# Patient Record
Sex: MALE | Race: WHITE | Employment: FULL TIME | URBAN - METROPOLITAN AREA
[De-identification: names, ages, dates, MRNs, and addresses within clinical notes are randomized per-mention and may not be internally consistent; named-entity substitution may affect disease eponyms.]

---

## 2022-04-25 ENCOUNTER — HOSPITAL ENCOUNTER (OUTPATIENT)
Dept: PREADMISSION TESTING | Age: 57
Discharge: HOME | End: 2022-04-29
Payer: COMMERCIAL

## 2022-04-25 VITALS
BODY MASS INDEX: 25.33 KG/M2 | HEART RATE: 56 BPM | SYSTOLIC BLOOD PRESSURE: 143 MMHG | WEIGHT: 187 LBS | RESPIRATION RATE: 20 BRPM | OXYGEN SATURATION: 99 % | DIASTOLIC BLOOD PRESSURE: 76 MMHG | TEMPERATURE: 97.3 F | HEIGHT: 72 IN

## 2022-04-25 LAB
ABSOLUTE EOS #: 0.14 K/UL (ref 0–0.44)
ABSOLUTE IMMATURE GRANULOCYTE: 0.02 K/UL (ref 0–0.3)
ABSOLUTE LYMPH #: 1.19 K/UL (ref 1.1–3.7)
ABSOLUTE MONO #: 0.48 K/UL (ref 0.1–1.2)
ANION GAP SERPL CALCULATED.3IONS-SCNC: 7 MMOL/L (ref 9–17)
BASOPHILS # BLD: 1 % (ref 0–2)
BASOPHILS ABSOLUTE: 0.04 K/UL (ref 0–0.2)
BILIRUBIN URINE: NEGATIVE
BUN BLDV-MCNC: 19 MG/DL (ref 6–20)
BUN/CREAT BLD: 17 (ref 9–20)
CALCIUM SERPL-MCNC: 9.8 MG/DL (ref 8.6–10.4)
CHLORIDE BLD-SCNC: 102 MMOL/L (ref 98–107)
CO2: 29 MMOL/L (ref 20–31)
COLOR: YELLOW
COMMENT UA: NORMAL
CREAT SERPL-MCNC: 1.09 MG/DL (ref 0.7–1.2)
EOSINOPHILS RELATIVE PERCENT: 3 % (ref 1–4)
GFR AFRICAN AMERICAN: >60 ML/MIN
GFR NON-AFRICAN AMERICAN: >60 ML/MIN
GFR SERPL CREATININE-BSD FRML MDRD: ABNORMAL ML/MIN/{1.73_M2}
GLUCOSE BLD-MCNC: 96 MG/DL (ref 70–99)
GLUCOSE URINE: NEGATIVE
HCT VFR BLD CALC: 44.3 % (ref 40.7–50.3)
HEMOGLOBIN: 14.6 G/DL (ref 13–17)
IMMATURE GRANULOCYTES: 1 %
INR BLD: 1
KETONES, URINE: NEGATIVE
LEUKOCYTE ESTERASE, URINE: NEGATIVE
LYMPHOCYTES # BLD: 28 % (ref 24–43)
MCH RBC QN AUTO: 29.2 PG (ref 25.2–33.5)
MCHC RBC AUTO-ENTMCNC: 33 G/DL (ref 28.4–34.8)
MCV RBC AUTO: 88.6 FL (ref 82.6–102.9)
MONOCYTES # BLD: 11 % (ref 3–12)
MRSA, DNA, NASAL: NEGATIVE
NITRITE, URINE: NEGATIVE
NRBC AUTOMATED: 0 PER 100 WBC
PARTIAL THROMBOPLASTIN TIME: 32 SEC (ref 23.9–33.8)
PDW BLD-RTO: 12.2 % (ref 11.8–14.4)
PH UA: 7 (ref 5–8)
PLATELET # BLD: 225 K/UL (ref 138–453)
PMV BLD AUTO: 9.7 FL (ref 8.1–13.5)
POTASSIUM SERPL-SCNC: 4.7 MMOL/L (ref 3.7–5.3)
PROTEIN UA: NEGATIVE
PROTHROMBIN TIME: 13.2 SEC (ref 11.5–14.2)
RBC # BLD: 5 M/UL (ref 4.21–5.77)
SEG NEUTROPHILS: 56 % (ref 36–65)
SEGMENTED NEUTROPHILS ABSOLUTE COUNT: 2.38 K/UL (ref 1.5–8.1)
SODIUM BLD-SCNC: 138 MMOL/L (ref 135–144)
SPECIFIC GRAVITY UA: 1.01 (ref 1–1.03)
SPECIMEN DESCRIPTION: NORMAL
TURBIDITY: CLEAR
URINE HGB: NEGATIVE
UROBILINOGEN, URINE: NORMAL
WBC # BLD: 4.3 K/UL (ref 3.5–11.3)

## 2022-04-25 PROCEDURE — 85610 PROTHROMBIN TIME: CPT

## 2022-04-25 PROCEDURE — 93005 ELECTROCARDIOGRAM TRACING: CPT | Performed by: ORTHOPAEDIC SURGERY

## 2022-04-25 PROCEDURE — 80048 BASIC METABOLIC PNL TOTAL CA: CPT

## 2022-04-25 PROCEDURE — 81003 URINALYSIS AUTO W/O SCOPE: CPT

## 2022-04-25 PROCEDURE — 85730 THROMBOPLASTIN TIME PARTIAL: CPT

## 2022-04-25 PROCEDURE — 36415 COLL VENOUS BLD VENIPUNCTURE: CPT

## 2022-04-25 PROCEDURE — 87086 URINE CULTURE/COLONY COUNT: CPT

## 2022-04-25 PROCEDURE — 85025 COMPLETE CBC W/AUTO DIFF WBC: CPT

## 2022-04-25 PROCEDURE — 87641 MR-STAPH DNA AMP PROBE: CPT

## 2022-04-25 RX ORDER — ROSUVASTATIN CALCIUM 10 MG/1
10 TABLET, COATED ORAL NIGHTLY
COMMUNITY

## 2022-04-25 RX ORDER — CALCIUM POLYCARBOPHIL 625 MG 625 MG/1
625 TABLET ORAL DAILY
COMMUNITY

## 2022-04-25 RX ORDER — M-VIT,TX,IRON,MINS/CALC/FOLIC 27MG-0.4MG
1 TABLET ORAL DAILY
COMMUNITY

## 2022-04-25 ASSESSMENT — PAIN DESCRIPTION - LOCATION: LOCATION: NECK

## 2022-04-25 ASSESSMENT — PAIN SCALES - GENERAL: PAINLEVEL_OUTOF10: 4

## 2022-04-25 ASSESSMENT — PAIN DESCRIPTION - DESCRIPTORS: DESCRIPTORS: ACHING;DISCOMFORT

## 2022-04-25 ASSESSMENT — PAIN DESCRIPTION - PAIN TYPE: TYPE: CHRONIC PAIN

## 2022-04-25 ASSESSMENT — PAIN DESCRIPTION - ORIENTATION: ORIENTATION: RIGHT;LEFT;MID

## 2022-04-25 NOTE — PRE-PROCEDURE INSTRUCTIONS
[facility]  Monday May 16th   Arrive at 5:30  Any questions call [phone]    Once you enter the hospital lobby, take the elevators to the second floor. Check-In is at the surgery registration desk. Continue to take your home medications as you normally do up to and including the night before surgery with the exception of any blood thinning medications. Please stop any blood thinning medications as directed by your surgeon or prescribing physician. Failure to stop certain medications may interfere with your scheduled surgery. These may include:  Aspirin, Warfarin (Coumadin), Clopidogrel (Plavix), Ibuprofen (Motrin, Advil), Naproxen (Aleve), Meloxicam (Mobic), Celecoxib (Celebrex), Eliquis, Pradaxa, Xarelto, Effient, Fish Oil, Herbal supplements. Please take the following medication(s) the day of surgery with a small sip of water:  none    PREPARING FOR YOUR SURGERY:     Before surgery, you can play an important role in your own health. Because skin is not sterile, we need to be sure that your skin is as free of germs as possible before surgery by carefully washing before surgery. Preparing or prepping skin before surgery can reduce the risk of a surgical site infection.   Do not shave the area of your body where your surgery will be performed unless you received specific permission from your physician. You will need to shower at home the night before surgery and the morning of surgery with a special soap called chlorhexidine gluconate (CHG*). *Not to be used by people allergic to Chlorhexidine Gluconate (CHG). Following these instructions will help you be sure that your skin is clean before surgery. Instructions on cleaning your skin before surgery: The night before your surgery:      You will need to shower with warm water (not hot) and the CHG soap.  Use a clean wash cloth and a clean towel. Have clean clothes available to put on after the shower.   First wash your hair with regular shampoo. Rinse your hair and body thoroughly to remove the shampoo. Zannie Cowden Wash your face with your regular soap or water only. Thoroughly rinse your body with warm water from the neck down.  Turn water off to prevent rinsing the soap off too soon.  With a clean wet washcloth and half of the CHG soap in the bottle, lather your entire body from the neck down. Do not use CHG soap near your eyes or ears to avoid injury to those areas.  Wash thoroughly, paying special attention to the area where your surgery will be performed.  Wash your body gently for five (5) minutes. Avoid scrubbing your skin too hard.  Turn the water back on and rinse your body thoroughly.  Pat yourself dry with a clean, soft towel. Do not apply lotion, cream or powder.  Dress with clean freshly washed clothes. The morning of surgery:     Repeat shower following steps above - using remaining half of CHG soap in bottle. Patient Instructions:    Zannie Cowden If you are having any type of anesthesia you are to have nothing to eat or drink after midnight the night before your surgery. This includes gum, mints, water or smoking or chewing tobacco.  The only exception to this is a small sip of water to take with any morning dose of heart, blood pressure, or seizure medications. No alcoholic beverages for 24 hours prior to surgery.  Brush your teeth but do not swallow water. · Do not wear any jewelry or body piercings day of surgery. Also, NO lotion, perfume or deodorant to be used the day of surgery. No nail polish on the operative extremity (arm/leg surgeries)    · Do not bring any valuables such as jewelry, cash, or credit cards. If you are staying overnight with us, please bring a small bag of personal items.  Please wear loose, comfortable clothing. If you are potentially going to have a cast or brace bring clothing that will fit over them.

## 2022-04-25 NOTE — H&P
History and Physical Service   [place] [address]    HISTORY AND PHYSICAL EXAMINATION            Date of Evaluation: 4/25/2022  Patient name:  Carlota Kearney  MRN:   [id number]  YOB: 1965  PCP:    Cassandra Woods MD    History Obtained From:     Patient, medical records    History of Present Illness: This is Carlota Kearney a 55 y.o. male who presents for a pre-admission testing appointment for an upcoming C 6-7 ACDF WITH HARDWARE REMOVAL @ C5-6 - [employer] [facility] by Tj Calvillo MD scheduled on 5/16/2022  at 0730  due to [address] [facility] [address]. The patient's chief complaint is neck shoulder and bilateral arm pain, numbness and tingling. 8-9 /10 pain that has progressively worsened over the past several years,. THE neck and shoulder pain  is CONTINUOUS WITH RIGHT ARM WEAKNESS INTERMITTENTLY, MINIMALLY CONTROLLED WITH ADVIL. Prior treatment includes STEROID INJECTIONS WHICH WERE HELPFUL AND PHYSICAL THERAPY WHICH WAS TEMPORARILY HELPFUL. Shirley Wang Denies recent falls and injuries. HAD A WHIPLASH INJURY 10 YEARS AGO. HE WORKS AS A . HE NOW PRESENTS FOR SURGICAL CORRECTION. Functional Capacity per pt:   1) Pt is able to walk 2 city blocks on level ground without SOB. 2) Pt is  able to climb 2 flights of stairs without SOB. 3) Pt is able to walk up a hill for 1-2 city blocks without SOB.     Past Medical History:     Past Medical History:   Diagnosis Date    Cancer ([address]     basil carcinoma    PONV (postoperative nausea and vomiting)         Past Surgical History:     Past Surgical History:   Procedure Laterality Date    ACHILLES TENDON SURGERY Right     APPENDECTOMY      COLONOSCOPY      KNEE CARTILAGE SURGERY Bilateral     NECK SURGERY  2020    C5 -C6    ROTATOR CUFF REPAIR Right 12/2020    SKIN BIOPSY      ULNAR TUNNEL RELEASE Right 08/2020        Medications Prior to Admission:     Prior to Admission medications    Medication Sig Start Date End Date Taking? Authorizing Provider   rosuvastatin (CRESTOR) 10 MG tablet Take 10 mg by mouth at bedtime   Yes Historical Provider, MD   Multiple Vitamins-Minerals (THERAPEUTIC MULTIVITAMIN-MINERALS) tablet Take 1 tablet by mouth daily   Yes Historical Provider, MD   polycarbophil (FIBERCON) 625 MG tablet Take 625 mg by mouth daily   Yes Historical Provider, MD        Allergies:     Patient has no known allergies. Social History:     Tobacco:    reports that he has never smoked. He has never used smokeless tobacco.  Alcohol:      reports current alcohol use. Drug Use:  reports no history of drug use. Family History:     FATHER  OF HEART DISEASE, MOTHER  OF SUICIDE. 2 SIBLINGS ARE HEALTHY     Review of Systems:     Positive and Negative as described in HPI. CONSTITUTIONAL:  Negative for fevers, chills, sweats, fatigue, and weight loss. HEENT:  Negative for glasses, hearing changes, rhinorrhea, and throat pain. RESPIRATORY:  Negative for shortness of breath, cough, congestion, and wheezing. CARDIOVASCULAR:  Negative for chest pain, blood clot, irregular heartbeat, and palpitations. GASTROINTESTINAL:  Negative for reflux, nausea, vomiting, diarrhea, constipation, change in bowel habits, and abdominal pain. GENITOURINARY:  Negative for difficulty of urination, burning with urination, and frequency. INTEGUMENT:  Negative for rash, skin lesions, and easy bruising. HEMATOLOGIC/LYMPHATIC:  Negative for swelling/edema. ALLERGIC/IMMUNOLOGIC:  Negative for urticaria and itching. ENDOCRINE: NEGATIVE for increase in thirst, increase in urination, and heat or cold intolerance. MUSCULOSKELETAL: NECK AND JOINT pains, muscle aches, and swelling of joints. NEUROLOGICAL:  Negative for headaches, dizziness, lightheadedness, numbness, and tingling extremities. BEHAVIOR/PSYCH:  Negative for depression and anxiety.     Physical Exam:   BP (!) 143/76   Pulse 56   Temp 97.3 °F (36.3 °C) (Temporal)   Resp 20 Ht 6' (1.829 m)   Wt 187 lb (84.8 kg)   SpO2 99%   BMI 25.36 kg/m²   No results for input(s): POCGLU in the last 72 hours. General Appearance:  Alert, well appearing, and in no acute distress. Mental status:  Oriented to person, place, and time. Head:  Normocephalic and atraumatic. Eye:  No icterus, redness, pupils equal and reactive, extraocular eye movements intact, and conjunctiva clear. Ear:  Hearing grossly intact. Nose:  No drainage noted. Mouth:  Mucous membranes moist.  Neck:  Supple and no carotid bruits noted. Lungs:  Bilateral equal air entry, clear to auscultation, no wheezing, rales or rhonchi, and normal effort. Cardiovascular:  Normal rate, regular rhythm, no murmur, gallop, or rub. Abdomen:  Soft, nontender, nondistended, and active bowel sounds. Neurologic:  Normal speech and cranial nerves II through XII grossly intact. Strength 5/5 bilaterally. Skin:  No gross lesions, rashes, bruising, or bleeding on exposed skin area. Extremities:  Posterior tibial pulses 2+ bilaterally. No pedal edema. No calf tenderness with palpation. Psych:  Normal affect.      Investigations:      Laboratory Testing:  Recent Results (from the past 24 hour(s))   APTT    Collection Time: 04/25/22  8:23 AM   Result Value Ref Range    PTT 32.0 23.9 - 33.8 sec   CBC with Auto Differential    Collection Time: 04/25/22  8:23 AM   Result Value Ref Range    WBC 4.3 3.5 - 11.3 k/uL    RBC 5.00 4.21 - 5.77 m/uL    Hemoglobin 14.6 13.0 - 17.0 g/dL    Hematocrit 44.3 40.7 - 50.3 %    MCV 88.6 82.6 - 102.9 fL    MCH 29.2 25.2 - 33.5 pg    MCHC 33.0 28.4 - 34.8 g/dL    RDW 12.2 11.8 - 14.4 %    Platelets 377 580 - 345 k/uL    MPV 9.7 8.1 - 13.5 fL    NRBC Automated 0.0 0.0 per 100 WBC    Seg Neutrophils 56 36 - 65 %    Lymphocytes 28 24 - 43 %    Monocytes 11 3 - 12 %    Eosinophils % 3 1 - 4 %    Basophils 1 0 - 2 %    Immature Granulocytes 1 (H) 0 %    Segs Absolute 2.38 1.50 - 8.10 k/uL    Absolute Lymph # 1.19 1.10 - 3.70 k/uL    Absolute Mono # 0.48 0.10 - 1.20 k/uL    Absolute Eos # 0.14 0.00 - 0.44 k/uL    Basophils Absolute 0.04 0.00 - 0.20 k/uL    Absolute Immature Granulocyte 0.02 0.00 - 0.30 k/uL   Protime-INR    Collection Time: 04/25/22  8:23 AM   Result Value Ref Range    Protime 13.2 11.5 - 14.2 sec    INR 1.0        Recent Labs     04/25/22  0823   HGB 14.6   HCT 44.3   WBC 4.3   MCV 88.6   INR 1.0   PROTIME 13.2   APTT 32.0       No results for input(s): COVID19 in the last 720 hours. *Please note that labs listed above are the most recent lab values available in EPIC at the time of the visit and additional labs may have been drawn or resulted since that time. Imaging/Diagnostics:      No results found. EKG: EKG OF 4/25/2022 ON THE SHORT CHART See Epic. Diagnosis:      1. DX CERVICAL STENOSIS    Plans:     1.  C 6-7 ACDF WITH HARDWARE REMOVAL @ -[facility] [address] [place] [address], APRN - CNP  4/25/2022  8:42 AM

## 2022-04-26 LAB
CULTURE: NO GROWTH
EKG ATRIAL RATE: 48 BPM
EKG P AXIS: 27 DEGREES
EKG P-R INTERVAL: 184 MS
EKG Q-T INTERVAL: 420 MS
EKG QRS DURATION: 114 MS
EKG QTC CALCULATION (BAZETT): 375 MS
EKG R AXIS: -3 DEGREES
EKG T AXIS: 46 DEGREES
EKG VENTRICULAR RATE: 48 BPM
SPECIMEN DESCRIPTION: NORMAL

## 2022-05-16 ENCOUNTER — HOSPITAL ENCOUNTER (OUTPATIENT)
Age: 57
Discharge: HOME | End: 2022-05-17
Attending: ORTHOPAEDIC SURGERY | Admitting: ORTHOPAEDIC SURGERY
Payer: COMMERCIAL

## 2022-05-16 ENCOUNTER — ANESTHESIA (OUTPATIENT)
Dept: OPERATING ROOM | Age: 57
End: 2022-05-16
Payer: COMMERCIAL

## 2022-05-16 ENCOUNTER — ANESTHESIA EVENT (OUTPATIENT)
Dept: OPERATING ROOM | Age: 57
End: 2022-05-16
Payer: COMMERCIAL

## 2022-05-16 ENCOUNTER — APPOINTMENT (OUTPATIENT)
Dept: GENERAL RADIOLOGY | Age: 57
End: 2022-05-16
Attending: ORTHOPAEDIC SURGERY
Payer: COMMERCIAL

## 2022-05-16 DIAGNOSIS — M47.22 CERVICAL SPONDYLOSIS WITH RADICULOPATHY: Primary | Chronic | ICD-10-CM

## 2022-05-16 PROCEDURE — 6370000000 HC RX 637 (ALT 250 FOR IP): Performed by: ORTHOPAEDIC SURGERY

## 2022-05-16 PROCEDURE — 2500000003 HC RX 250 WO HCPCS: Performed by: NURSE ANESTHETIST, CERTIFIED REGISTERED

## 2022-05-16 PROCEDURE — 7100000001 HC PACU RECOVERY - ADDTL 15 MIN: Performed by: ORTHOPAEDIC SURGERY

## 2022-05-16 PROCEDURE — A4217 STERILE WATER/SALINE, 500 ML: HCPCS | Performed by: ORTHOPAEDIC SURGERY

## 2022-05-16 PROCEDURE — 97530 THERAPEUTIC ACTIVITIES: CPT

## 2022-05-16 PROCEDURE — 97110 THERAPEUTIC EXERCISES: CPT

## 2022-05-16 PROCEDURE — 2780000010 HC IMPLANT OTHER: Performed by: ORTHOPAEDIC SURGERY

## 2022-05-16 PROCEDURE — 6360000002 HC RX W HCPCS: Performed by: ANESTHESIOLOGY

## 2022-05-16 PROCEDURE — 2580000003 HC RX 258: Performed by: ORTHOPAEDIC SURGERY

## 2022-05-16 PROCEDURE — 6360000002 HC RX W HCPCS: Performed by: ORTHOPAEDIC SURGERY

## 2022-05-16 PROCEDURE — 2720000010 HC SURG SUPPLY STERILE: Performed by: ORTHOPAEDIC SURGERY

## 2022-05-16 PROCEDURE — 6360000002 HC RX W HCPCS: Performed by: NURSE ANESTHETIST, CERTIFIED REGISTERED

## 2022-05-16 PROCEDURE — 97161 PT EVAL LOW COMPLEX 20 MIN: CPT

## 2022-05-16 PROCEDURE — 2580000003 HC RX 258: Performed by: ANESTHESIOLOGY

## 2022-05-16 PROCEDURE — 3700000000 HC ANESTHESIA ATTENDED CARE: Performed by: ORTHOPAEDIC SURGERY

## 2022-05-16 PROCEDURE — 2709999900 HC NON-CHARGEABLE SUPPLY: Performed by: ORTHOPAEDIC SURGERY

## 2022-05-16 PROCEDURE — 3209999900 FLUORO FOR SURGICAL PROCEDURES

## 2022-05-16 PROCEDURE — 6370000000 HC RX 637 (ALT 250 FOR IP): Performed by: ANESTHESIOLOGY

## 2022-05-16 PROCEDURE — 3700000001 HC ADD 15 MINUTES (ANESTHESIA): Performed by: ORTHOPAEDIC SURGERY

## 2022-05-16 PROCEDURE — C1713 ANCHOR/SCREW BN/BN,TIS/BN: HCPCS | Performed by: ORTHOPAEDIC SURGERY

## 2022-05-16 PROCEDURE — 7100000000 HC PACU RECOVERY - FIRST 15 MIN: Performed by: ORTHOPAEDIC SURGERY

## 2022-05-16 PROCEDURE — 2500000003 HC RX 250 WO HCPCS: Performed by: ORTHOPAEDIC SURGERY

## 2022-05-16 PROCEDURE — 97116 GAIT TRAINING THERAPY: CPT

## 2022-05-16 PROCEDURE — 3600000004 HC SURGERY LEVEL 4 BASE: Performed by: ORTHOPAEDIC SURGERY

## 2022-05-16 PROCEDURE — 3600000014 HC SURGERY LEVEL 4 ADDTL 15MIN: Performed by: ORTHOPAEDIC SURGERY

## 2022-05-16 DEVICE — GRAFT BNE SUB SM 1ML CRYOPRESERVED VIABLE CORT CANC BNE: Type: IMPLANTABLE DEVICE | Site: NECK | Status: FUNCTIONAL

## 2022-05-16 DEVICE — SCREW SPNL L16MM DIA3.7MM NONSTERILE ANT CERV PUR TI SELF: Type: IMPLANTABLE DEVICE | Site: NECK | Status: FUNCTIONAL

## 2022-05-16 DEVICE — SPACER SPNL ZERO-P VA IMPL 9MM LORDOTIC STERILE: Type: IMPLANTABLE DEVICE | Site: NECK | Status: FUNCTIONAL

## 2022-05-16 RX ORDER — SODIUM CHLORIDE 9 MG/ML
INJECTION, SOLUTION INTRAVENOUS PRN
Status: DISCONTINUED | OUTPATIENT
Start: 2022-05-16 | End: 2022-05-16 | Stop reason: HOSPADM

## 2022-05-16 RX ORDER — OXYCODONE HYDROCHLORIDE AND ACETAMINOPHEN 5; 325 MG/1; MG/1
2 TABLET ORAL EVERY 4 HOURS PRN
Status: DISCONTINUED | OUTPATIENT
Start: 2022-05-16 | End: 2022-05-17 | Stop reason: HOSPADM

## 2022-05-16 RX ORDER — SODIUM CHLORIDE 0.9 % (FLUSH) 0.9 %
5-40 SYRINGE (ML) INJECTION EVERY 12 HOURS SCHEDULED
Status: DISCONTINUED | OUTPATIENT
Start: 2022-05-16 | End: 2022-05-16 | Stop reason: HOSPADM

## 2022-05-16 RX ORDER — LIDOCAINE HYDROCHLORIDE 10 MG/ML
1 INJECTION, SOLUTION EPIDURAL; INFILTRATION; INTRACAUDAL; PERINEURAL
Status: DISCONTINUED | OUTPATIENT
Start: 2022-05-16 | End: 2022-05-16 | Stop reason: HOSPADM

## 2022-05-16 RX ORDER — TIZANIDINE 4 MG/1
4 TABLET ORAL EVERY 8 HOURS PRN
Status: DISCONTINUED | OUTPATIENT
Start: 2022-05-16 | End: 2022-05-17 | Stop reason: HOSPADM

## 2022-05-16 RX ORDER — HYDROXYZINE HYDROCHLORIDE 10 MG/1
10 TABLET, FILM COATED ORAL EVERY 8 HOURS PRN
Status: DISCONTINUED | OUTPATIENT
Start: 2022-05-16 | End: 2022-05-17 | Stop reason: HOSPADM

## 2022-05-16 RX ORDER — SENNA AND DOCUSATE SODIUM 50; 8.6 MG/1; MG/1
1 TABLET, FILM COATED ORAL 2 TIMES DAILY
Status: DISCONTINUED | OUTPATIENT
Start: 2022-05-16 | End: 2022-05-17 | Stop reason: HOSPADM

## 2022-05-16 RX ORDER — ONDANSETRON 2 MG/ML
4 INJECTION INTRAMUSCULAR; INTRAVENOUS
Status: DISCONTINUED | OUTPATIENT
Start: 2022-05-16 | End: 2022-05-16 | Stop reason: HOSPADM

## 2022-05-16 RX ORDER — SODIUM CHLORIDE, SODIUM LACTATE, POTASSIUM CHLORIDE, CALCIUM CHLORIDE 600; 310; 30; 20 MG/100ML; MG/100ML; MG/100ML; MG/100ML
INJECTION, SOLUTION INTRAVENOUS CONTINUOUS
Status: DISCONTINUED | OUTPATIENT
Start: 2022-05-16 | End: 2022-05-16

## 2022-05-16 RX ORDER — ONDANSETRON 2 MG/ML
INJECTION INTRAMUSCULAR; INTRAVENOUS PRN
Status: DISCONTINUED | OUTPATIENT
Start: 2022-05-16 | End: 2022-05-16 | Stop reason: SDUPTHER

## 2022-05-16 RX ORDER — MORPHINE SULFATE 2 MG/ML
2 INJECTION, SOLUTION INTRAMUSCULAR; INTRAVENOUS
Status: DISCONTINUED | OUTPATIENT
Start: 2022-05-16 | End: 2022-05-17 | Stop reason: HOSPADM

## 2022-05-16 RX ORDER — DEXAMETHASONE SODIUM PHOSPHATE 10 MG/ML
INJECTION, SOLUTION INTRAMUSCULAR; INTRAVENOUS PRN
Status: DISCONTINUED | OUTPATIENT
Start: 2022-05-16 | End: 2022-05-16 | Stop reason: SDUPTHER

## 2022-05-16 RX ORDER — SODIUM CHLORIDE 0.9 % (FLUSH) 0.9 %
5-40 SYRINGE (ML) INJECTION PRN
Status: DISCONTINUED | OUTPATIENT
Start: 2022-05-16 | End: 2022-05-16 | Stop reason: HOSPADM

## 2022-05-16 RX ORDER — FENTANYL CITRATE 50 UG/ML
INJECTION, SOLUTION INTRAMUSCULAR; INTRAVENOUS PRN
Status: DISCONTINUED | OUTPATIENT
Start: 2022-05-16 | End: 2022-05-16 | Stop reason: SDUPTHER

## 2022-05-16 RX ORDER — OXYCODONE HYDROCHLORIDE 5 MG/1
10 TABLET ORAL EVERY 4 HOURS PRN
Status: DISCONTINUED | OUTPATIENT
Start: 2022-05-16 | End: 2022-05-17

## 2022-05-16 RX ORDER — SODIUM CHLORIDE 9 MG/ML
INJECTION, SOLUTION INTRAVENOUS CONTINUOUS
Status: DISCONTINUED | OUTPATIENT
Start: 2022-05-16 | End: 2022-05-16

## 2022-05-16 RX ORDER — SODIUM CHLORIDE 0.9 % (FLUSH) 0.9 %
5-40 SYRINGE (ML) INJECTION EVERY 12 HOURS SCHEDULED
Status: DISCONTINUED | OUTPATIENT
Start: 2022-05-16 | End: 2022-05-17 | Stop reason: HOSPADM

## 2022-05-16 RX ORDER — PROMETHAZINE HYDROCHLORIDE 12.5 MG/1
12.5 TABLET ORAL EVERY 6 HOURS PRN
Status: DISCONTINUED | OUTPATIENT
Start: 2022-05-16 | End: 2022-05-17 | Stop reason: HOSPADM

## 2022-05-16 RX ORDER — SCOLOPAMINE TRANSDERMAL SYSTEM 1 MG/1
1 PATCH, EXTENDED RELEASE TRANSDERMAL ONCE
Status: DISCONTINUED | OUTPATIENT
Start: 2022-05-16 | End: 2022-05-16

## 2022-05-16 RX ORDER — DEXAMETHASONE SODIUM PHOSPHATE 10 MG/ML
6 INJECTION, SOLUTION INTRAMUSCULAR; INTRAVENOUS EVERY 8 HOURS
Status: COMPLETED | OUTPATIENT
Start: 2022-05-16 | End: 2022-05-17

## 2022-05-16 RX ORDER — POLYETHYLENE GLYCOL 3350 17 G/17G
17 POWDER, FOR SOLUTION ORAL DAILY
Status: DISCONTINUED | OUTPATIENT
Start: 2022-05-16 | End: 2022-05-17 | Stop reason: HOSPADM

## 2022-05-16 RX ORDER — KETOROLAC TROMETHAMINE 30 MG/ML
INJECTION, SOLUTION INTRAMUSCULAR; INTRAVENOUS PRN
Status: DISCONTINUED | OUTPATIENT
Start: 2022-05-16 | End: 2022-05-16 | Stop reason: SDUPTHER

## 2022-05-16 RX ORDER — MIDAZOLAM HYDROCHLORIDE 1 MG/ML
INJECTION INTRAMUSCULAR; INTRAVENOUS PRN
Status: DISCONTINUED | OUTPATIENT
Start: 2022-05-16 | End: 2022-05-16 | Stop reason: SDUPTHER

## 2022-05-16 RX ORDER — DIPHENHYDRAMINE HYDROCHLORIDE 50 MG/ML
12.5 INJECTION INTRAMUSCULAR; INTRAVENOUS ONCE
Status: COMPLETED | OUTPATIENT
Start: 2022-05-16 | End: 2022-05-16

## 2022-05-16 RX ORDER — CALCIUM POLYCARBOPHIL 625 MG 625 MG/1
625 TABLET ORAL DAILY
Status: DISCONTINUED | OUTPATIENT
Start: 2022-05-16 | End: 2022-05-17 | Stop reason: HOSPADM

## 2022-05-16 RX ORDER — FENTANYL CITRATE 50 UG/ML
25 INJECTION, SOLUTION INTRAMUSCULAR; INTRAVENOUS EVERY 5 MIN PRN
Status: DISCONTINUED | OUTPATIENT
Start: 2022-05-16 | End: 2022-05-16 | Stop reason: HOSPADM

## 2022-05-16 RX ORDER — OXYCODONE HYDROCHLORIDE AND ACETAMINOPHEN 5; 325 MG/1; MG/1
1 TABLET ORAL EVERY 4 HOURS PRN
Status: DISCONTINUED | OUTPATIENT
Start: 2022-05-16 | End: 2022-05-17 | Stop reason: HOSPADM

## 2022-05-16 RX ORDER — OXYCODONE HYDROCHLORIDE 5 MG/1
5 TABLET ORAL EVERY 4 HOURS PRN
Status: DISCONTINUED | OUTPATIENT
Start: 2022-05-16 | End: 2022-05-17

## 2022-05-16 RX ORDER — SODIUM CHLORIDE 9 MG/ML
INJECTION, SOLUTION INTRAVENOUS PRN
Status: DISCONTINUED | OUTPATIENT
Start: 2022-05-16 | End: 2022-05-17 | Stop reason: HOSPADM

## 2022-05-16 RX ORDER — HYDROMORPHONE HYDROCHLORIDE 1 MG/ML
0.25 INJECTION, SOLUTION INTRAMUSCULAR; INTRAVENOUS; SUBCUTANEOUS EVERY 5 MIN PRN
Status: DISCONTINUED | OUTPATIENT
Start: 2022-05-16 | End: 2022-05-16 | Stop reason: HOSPADM

## 2022-05-16 RX ORDER — SODIUM CHLORIDE 0.9 % (FLUSH) 0.9 %
5-40 SYRINGE (ML) INJECTION PRN
Status: DISCONTINUED | OUTPATIENT
Start: 2022-05-16 | End: 2022-05-17 | Stop reason: HOSPADM

## 2022-05-16 RX ORDER — LABETALOL HYDROCHLORIDE 5 MG/ML
INJECTION, SOLUTION INTRAVENOUS PRN
Status: DISCONTINUED | OUTPATIENT
Start: 2022-05-16 | End: 2022-05-16 | Stop reason: SDUPTHER

## 2022-05-16 RX ORDER — PROPOFOL 10 MG/ML
INJECTION, EMULSION INTRAVENOUS PRN
Status: DISCONTINUED | OUTPATIENT
Start: 2022-05-16 | End: 2022-05-16 | Stop reason: SDUPTHER

## 2022-05-16 RX ORDER — FAMOTIDINE 20 MG/1
20 TABLET, FILM COATED ORAL 2 TIMES DAILY
Status: DISCONTINUED | OUTPATIENT
Start: 2022-05-16 | End: 2022-05-17 | Stop reason: HOSPADM

## 2022-05-16 RX ORDER — SODIUM CHLORIDE 9 MG/ML
INJECTION, SOLUTION INTRAVENOUS CONTINUOUS
Status: DISCONTINUED | OUTPATIENT
Start: 2022-05-16 | End: 2022-05-17 | Stop reason: HOSPADM

## 2022-05-16 RX ORDER — LIDOCAINE HYDROCHLORIDE 20 MG/ML
INJECTION, SOLUTION EPIDURAL; INFILTRATION; INTRACAUDAL; PERINEURAL PRN
Status: DISCONTINUED | OUTPATIENT
Start: 2022-05-16 | End: 2022-05-16 | Stop reason: SDUPTHER

## 2022-05-16 RX ORDER — MORPHINE SULFATE 4 MG/ML
4 INJECTION, SOLUTION INTRAMUSCULAR; INTRAVENOUS
Status: DISCONTINUED | OUTPATIENT
Start: 2022-05-16 | End: 2022-05-17 | Stop reason: HOSPADM

## 2022-05-16 RX ORDER — ROCURONIUM BROMIDE 10 MG/ML
INJECTION, SOLUTION INTRAVENOUS PRN
Status: DISCONTINUED | OUTPATIENT
Start: 2022-05-16 | End: 2022-05-16 | Stop reason: SDUPTHER

## 2022-05-16 RX ORDER — ROSUVASTATIN CALCIUM 10 MG/1
10 TABLET, COATED ORAL NIGHTLY
Status: DISCONTINUED | OUTPATIENT
Start: 2022-05-16 | End: 2022-05-17 | Stop reason: HOSPADM

## 2022-05-16 RX ORDER — KETAMINE HCL IN NACL, ISO-OSM 100MG/10ML
SYRINGE (ML) INJECTION PRN
Status: DISCONTINUED | OUTPATIENT
Start: 2022-05-16 | End: 2022-05-16 | Stop reason: SDUPTHER

## 2022-05-16 RX ORDER — ONDANSETRON 2 MG/ML
4 INJECTION INTRAMUSCULAR; INTRAVENOUS EVERY 6 HOURS PRN
Status: DISCONTINUED | OUTPATIENT
Start: 2022-05-16 | End: 2022-05-17 | Stop reason: HOSPADM

## 2022-05-16 RX ADMIN — PROPOFOL 25 MCG/KG/MIN: 10 INJECTION, EMULSION INTRAVENOUS at 07:59

## 2022-05-16 RX ADMIN — DIPHENHYDRAMINE HYDROCHLORIDE 12.5 MG: 50 INJECTION, SOLUTION INTRAMUSCULAR; INTRAVENOUS at 07:31

## 2022-05-16 RX ADMIN — SODIUM CHLORIDE, POTASSIUM CHLORIDE, SODIUM LACTATE AND CALCIUM CHLORIDE: 600; 310; 30; 20 INJECTION, SOLUTION INTRAVENOUS at 09:10

## 2022-05-16 RX ADMIN — SENNOSIDES AND DOCUSATE SODIUM 1 TABLET: 50; 8.6 TABLET ORAL at 16:45

## 2022-05-16 RX ADMIN — Medication 100 MCG: at 07:41

## 2022-05-16 RX ADMIN — FAMOTIDINE 20 MG: 20 TABLET, FILM COATED ORAL at 21:03

## 2022-05-16 RX ADMIN — DEXAMETHASONE SODIUM PHOSPHATE 6 MG: 10 INJECTION INTRAMUSCULAR; INTRAVENOUS at 23:32

## 2022-05-16 RX ADMIN — Medication 10 MG: at 08:16

## 2022-05-16 RX ADMIN — Medication 50 MCG: at 08:26

## 2022-05-16 RX ADMIN — SUGAMMADEX 200 MG: 100 INJECTION, SOLUTION INTRAVENOUS at 09:37

## 2022-05-16 RX ADMIN — MIDAZOLAM 2 MG: 1 INJECTION INTRAMUSCULAR; INTRAVENOUS at 07:34

## 2022-05-16 RX ADMIN — LABETALOL HYDROCHLORIDE 5 MG: 5 INJECTION INTRAVENOUS at 08:30

## 2022-05-16 RX ADMIN — Medication 15 MG: at 08:22

## 2022-05-16 RX ADMIN — SODIUM CHLORIDE: 9 INJECTION, SOLUTION INTRAVENOUS at 12:28

## 2022-05-16 RX ADMIN — PROPOFOL 200 MG: 10 INJECTION, EMULSION INTRAVENOUS at 07:41

## 2022-05-16 RX ADMIN — LIDOCAINE HYDROCHLORIDE 100 MG: 20 INJECTION, SOLUTION EPIDURAL; INFILTRATION; INTRACAUDAL at 07:41

## 2022-05-16 RX ADMIN — DEXAMETHASONE SODIUM PHOSPHATE 10 MG: 10 INJECTION, SOLUTION INTRAMUSCULAR; INTRAVENOUS at 07:45

## 2022-05-16 RX ADMIN — CEFAZOLIN SODIUM 2000 MG: 10 INJECTION, POWDER, FOR SOLUTION INTRAVENOUS at 23:35

## 2022-05-16 RX ADMIN — KETOROLAC TROMETHAMINE 30 MG: 30 INJECTION, SOLUTION INTRAMUSCULAR at 09:26

## 2022-05-16 RX ADMIN — Medication 25 MG: at 07:45

## 2022-05-16 RX ADMIN — FAMOTIDINE 20 MG: 20 TABLET, FILM COATED ORAL at 18:38

## 2022-05-16 RX ADMIN — CALCIUM POLYCARBOPHIL 625 MG: 625 TABLET, FILM COATED ORAL at 16:43

## 2022-05-16 RX ADMIN — ROCURONIUM BROMIDE 40 MG: 10 INJECTION, SOLUTION INTRAVENOUS at 07:41

## 2022-05-16 RX ADMIN — SODIUM CHLORIDE: 9 INJECTION, SOLUTION INTRAVENOUS at 21:10

## 2022-05-16 RX ADMIN — DEXAMETHASONE SODIUM PHOSPHATE 6 MG: 10 INJECTION INTRAMUSCULAR; INTRAVENOUS at 16:44

## 2022-05-16 RX ADMIN — SODIUM CHLORIDE, POTASSIUM CHLORIDE, SODIUM LACTATE AND CALCIUM CHLORIDE: 600; 310; 30; 20 INJECTION, SOLUTION INTRAVENOUS at 06:52

## 2022-05-16 RX ADMIN — ROCURONIUM BROMIDE 10 MG: 10 INJECTION, SOLUTION INTRAVENOUS at 08:30

## 2022-05-16 RX ADMIN — CEFAZOLIN SODIUM 2000 MG: 10 INJECTION, POWDER, FOR SOLUTION INTRAVENOUS at 16:50

## 2022-05-16 RX ADMIN — MENTHOL 5.8 MG: 5.8 LOZENGE ORAL at 23:33

## 2022-05-16 RX ADMIN — CEFAZOLIN SODIUM 2000 MG: 10 INJECTION, POWDER, FOR SOLUTION INTRAVENOUS at 07:34

## 2022-05-16 RX ADMIN — ONDANSETRON 4 MG: 2 INJECTION INTRAMUSCULAR; INTRAVENOUS at 09:32

## 2022-05-16 ASSESSMENT — PAIN SCALES - GENERAL
PAINLEVEL_OUTOF10: 0
PAINLEVEL_OUTOF10: 0
PAINLEVEL_OUTOF10: 1
PAINLEVEL_OUTOF10: 0

## 2022-05-16 ASSESSMENT — PAIN DESCRIPTION - PAIN TYPE: TYPE: ACUTE PAIN;SURGICAL PAIN

## 2022-05-16 ASSESSMENT — PAIN DESCRIPTION - LOCATION: LOCATION: THROAT

## 2022-05-16 ASSESSMENT — PAIN DESCRIPTION - DESCRIPTORS: DESCRIPTORS: SORE

## 2022-05-16 ASSESSMENT — PAIN DESCRIPTION - FREQUENCY: FREQUENCY: CONTINUOUS

## 2022-05-16 ASSESSMENT — PAIN - FUNCTIONAL ASSESSMENT: PAIN_FUNCTIONAL_ASSESSMENT: ACTIVITIES ARE NOT PREVENTED

## 2022-05-16 NOTE — PROGRESS NOTES
Pt admitted to room 2001 in stable condition from surgery. Oriented to room and surroundings  Bed in lowest position, wheels locked, 2/4 side rails up. Call light in reach, room free of clutter, adequate lighting provided. Denies any further questions at this time. Instructed to call out with any questions/concerns/new onset of pain and/or n/v. White board updated. MEDICATION EDUCATION SHEET in place for patient/family to view & ask questions.

## 2022-05-16 NOTE — ANESTHESIA POSTPROCEDURE EVALUATION
Department of Anesthesiology  Postprocedure Note    Patient: Frances Sofia  MRN: [id number]  YOB: 1965  Date of evaluation: 5/16/2022  Time:  12:35 PM     Procedure Summary     Date: 05/16/22 [facility] / [facility]    Anesthesia Start: 4367 Anesthesia Stop: 0763    Procedure: C 6-7 ACDF  NABOR (N/A Neck) Diagnosis: (DX CERVICAL STENOSIS)    Surgeons: Yovany Ellison MD Responsible Provider: Erika Pavon MD    Anesthesia Type: general ASA Status: 2          Anesthesia Type: No value filed. Debra Phase I: Debra Score: 10    Debra Phase II:      Last vitals: Reviewed and per EMR flowsheets.        Anesthesia Post Evaluation    Patient location during evaluation: PACU  Patient participation: complete - patient participated  Level of consciousness: awake  Airway patency: patent  Nausea & Vomiting: no nausea  Complications: no  Cardiovascular status: blood pressure returned to baseline  Respiratory status: acceptable  Hydration status: euvolemic  Comments: Multimodal analgesia pain management as indicated by procedure  Multimodal analgesia pain management approach

## 2022-05-16 NOTE — PROGRESS NOTES
Physical Therapy  Facility/Department: [facility] MED SURG  Physical Therapy Initial Assessment    Name: Junior Marco CASTILLO  : 1965  MRN: [id number]  Date of Service: 2022    Discharge Recommendations:  Home independently,Outpatient PT     Pt presented to surgery on 22 for:    C 6-7 ACDF WITH HARDWARE REMOVAL @ C5-6 secondary Cervical disc disease. RN reports patient is medically stable for therapy treatment this date. Chart reviewed prior to treatment and patient is agreeable for therapy. Patient Diagnosis(es): There were no encounter diagnoses. Past Medical History:  has a past medical history of Arthritis, [address] [address] Cervical disc disease, Neck pain, and PONV (postoperative nausea and vomiting). Past Surgical History:  has a past surgical history that includes skin biopsy; Colonoscopy; Neck surgery (); Rotator cuff repair (Right, 2020); Ulnar tunnel release (Right, 2020); Achilles tendon surgery (Right); Knee cartilage surgery (Bilateral); Appendectomy; and cervical fusion (N/A, 2022). Assessment   Body Structures, Functions, Activity Limitations Requiring Skilled Therapeutic Intervention: Decreased functional mobility ; Decreased endurance  Assessment: Pt tolerated session well with minimal deficits noted in bed mobility, transfers, ambulation, balance, and endurance this session. Pt to benefit with cont'd PT for further  functional mobility, gait & for pt education.   Pt is appropriate to D/C home with assist & recommend OP PT for reconditioning per Dr Gemma Burrell once pt is off spinal precautions  Therapy Prognosis: Good  Decision Making: Low Complexity  Exam: ROM, MMT, functional mobility, activity tolerance, Balance, & [employer] AM-PAC 6 Clicks Basic Mobility  Clinical Presentation: evolving  Requires PT Follow-Up: Yes  Activity Tolerance  Activity Tolerance: Patient limited by endurance      Patient Education     Education Given To Patient   Education Provided Role of Therapy; Plan of Care; Precautions; Transfer Training;  Fall Prevention Strategies   Education Provided Comments PT POC, safe functional mobility, spinal precautions, prevention of sedentary complications, wearing of cervical collar at all times except for bathing & dressing, postural ex's, & home walking program   Education Method Demonstration; Verbal; Printed   Education Outcome Verbalized understanding       Plan   Plan  Current Treatment Recommendations: Gait training,Home exercise program,Safety education & training,Patient/Caregiver education & training,Positioning  Plan Comment: 1 more visit  Safety Devices  Type of Devices: Call light within reach,Gait belt,Nurse notified     Restrictions  Restrictions/Precautions  Restrictions/Precautions: General Precautions,Surgical Protocols,Fall Risk  Required Braces or Orthoses?: Yes  Required Braces or Orthoses  Cervical: c-collar  Position Activity Restriction  Other position/activity restrictions: ambulate, activity as tolerated, pt to wear Cervical collar, dysphagia precautions, L hand IV     Subjective   General  Chart Reviewed: Yes  Patient assessed for rehabilitation services?: Yes  Additional Pertinent Hx: Cervical disc disease, PONV  Response To Previous Treatment: Not applicable  General Comment  Comments: RN okays PT  Subjective  Subjective: Pt agreeable to PT, denies any pain         Social/Functional History  Social/Functional History  Lives With: Family,Spouse (& teenage children)  Type of Home: House  Home Layout: Two level,Able to Live on Main level with bedroom/bathroom  Home Access: Stairs to enter without rails  Bathroom Shower/Tub: Walk-in shower  Bathroom Equipment:  (none)  Home Equipment:  (none)  ADL Assistance: Independent  Homemaking Assistance: Independent  Homemaking Responsibilities: Yes  Ambulation Assistance: Independent  Transfer Assistance: Independent  Active : Yes  Mode of Transportation: Car  Occupation: Full time employment  Type of Occupation:   Additional Comments: Pt denies any falls  Vision/Hearing  Hearing: Within functional limits    Cognition   Orientation  Overall Orientation Status: Within Functional Limits  Orientation Level: Oriented X4  Cognition  Overall Cognitive Status: WFL     Objective        Observation/Palpation  Posture: Good  Observation: pt wearing cerv collar, appears comfortable  Edema: none  Scar: pt has dressing ant cervical covering post op incision        AROM RLE (degrees)  RLE AROM: WFL  AROM LLE (degrees)  LLE AROM : WFL  AROM RUE (degrees)  RUE AROM : WFL  AROM LUE (degrees)  LUE AROM : WFL  Strength RLE  Strength RLE: WFL  Strength LLE  Strength LLE: WFL  Strength RUE  Strength RUE: WFL  Comment: deferred shoulder secondary ACDF 5/16/22  Strength LUE  Strength LUE: WFL  Comment: deferred shoulder secondary ACDF 5/16/22  Tone RLE  RLE Tone: Normotonic  Tone LLE  LLE Tone: Normotonic  Coordination  Movements Are Fluid And Coordinated: Yes  Sensation  Overall Sensation Status: WFL (pt denies any paresthesias, did have arm sx's before sx but no longer present)     Bed mobility  Supine to Sit: Stand by assistance  Sit to Supine: Stand by assistance  Scooting: Stand by assistance  Bed Mobility Comments: MIN cues for hand placement on bed rail as needed, rolling for spinal restricitons, pacing and use of BUE's to scoot fully out to EOB as well as awareness/assist with lines to increase safety.   Transfers  Sit to Stand: Contact guard assistance  Stand to sit: Contact guard assistance  Bed to Chair: Contact guard assistance  Stand Pivot Transfers: Contact guard assistance  Lateral Transfers: Contact guard assistance  Comment: MIN VC on correct use of upper body for safe sit/stand + to back all way back to surface until he feels touch behind legs & to ensure he reaches with UB support to surface he is sitting to,  &  to slow down & take time for transitions to make sure he has no feeling of dizziness  + correct use of upper body for safe sit/stand + to back all way back to surface until he feels it touch behind his legs & to ensure he reaches with UB support to arms of lana  Ambulation  WB Status: yes  Ambulation  Surface: level tile  Device: No Device  Assistance: Contact guard assistance  Quality of Gait: step to pattern & amb slow & guarded, MIN cues for upright posture for safety/scanning & awareness for peripheral 2* wearing of cervical collar  Distance: 100ft     Balance  Posture: Good  Sitting - Static: Good  Sitting - Dynamic: Good  Standing - Static: Good           OutComes Score                                                  AM-PAC Score  AM-PAC Inpatient Mobility Raw Score : 22 (05/16/22 1342)  AM-PAC Inpatient T-Scale Score : 53.28 (05/16/22 1342)  Mobility Inpatient CMS 0-100% Score: 20.91 (05/16/22 1342)  Mobility Inpatient CMS G-Code Modifier : CJ (05/16/22 1342)          Goals  Short Term Goals  Time Frame for Short term goals: 3 visits  Short term goal 1: Inc bed-mobility & transfers to independent to enable pt to safely get in/OOB & chair with spinal precautions  Short term goal 2: Inc gait to amb 400 ft or > indep to enable pt to return to previous level of independence & promotion of home walking program  Short term goal 3: Ed spinal precautions, wearing of cervical collar at all times except for bathing & dressing, postural ex's, energy conservation & safety principles, prevention sedentary complications & home walking program, & issue written Pt Ed with Pt demonstrating GOOD carryover       Education  Patient Education  Education Given To: Patient  Education Provided: Role of Therapy;Plan of Care;Precautions;Transfer Training; Fall Prevention Strategies  Education Provided Comments: PT POC, safe functional mobility, spinal precautions, prevention of sedentary complications, wearing of cervical collar at all times except for bathing & dressing, postural ex's, & home walking program  Education Method: Demonstration;Verbal  Education Outcome: Verbalized understanding      Therapy Time   Individual Concurrent Group Co-treatment   Time In 1737         Time Out 1342         Minutes 45              Treatment time: 40 minutes  Additional 10 minutes for chart review          [address] \[facility]", PT

## 2022-05-16 NOTE — H&P
Interval H&P Note    Pt Name: Heri Chavez  MRN: [id number]  YOB: 1965  Date of evaluation: 5/16/2022      [x] I have reviewed in epic the H&P by Consuelo ALVAREZ dated 4/25/2022 for an Interval History and Physical note. [x] I have examined  Mihaela Land III  There are no changes to the patient who is scheduled for C 6-7 ACDF WITH HARDWARE REMOVAL @ C5-6 - [employer] [facility] by Rebecca Linares MD for DX CERVICAL STENOSIS. The patient denies new health changes, fever, chills, wheezing, cough, increased SOB, chest pain, open sores or wounds. Denies hx diabetes. Denies taking any blood thinning medications in the last 10 days. Vital signs: /77   Pulse 58   Temp 97.7 °F (36.5 °C) (Temporal)   Resp 18   Ht 6' (1.829 m)   Wt 187 lb (84.8 kg)   SpO2 100%   BMI 25.36 kg/m²     Allergies:  Patient has no known allergies. Medications:    Prior to Admission medications    Medication Sig Start Date End Date Taking? Authorizing Provider   rosuvastatin (CRESTOR) 10 MG tablet Take 10 mg by mouth at bedtime    Historical Provider, MD   Multiple Vitamins-Minerals (THERAPEUTIC MULTIVITAMIN-MINERALS) tablet Take 1 tablet by mouth daily    Historical Provider, MD   polycarbophil (FIBERCON) 625 MG tablet Take 625 mg by mouth daily    Historical Provider, MD         This is a 55 y.o. male who is pleasant, cooperative, alert and oriented x3, in no acute distress. Heart: Heart sounds are normal.  HR 58 asymptomatic bradycardic rate and regular rhythm without murmur, gallop or rub. Lungs: Normal respiratory effort with equal expansion, good air exchange, unlabored and clear to auscultation without wheezes or rales bilaterally   Abdomen: soft, nontender, nondistended with bowel sounds active. Neurologic: Bilateral upper and lower extremity strength equal 5/5.      Labs:  Recent Labs     04/25/22  0823   HGB 14.6   HCT 44.3   WBC 4.3   MCV 88.6         K 4.7      CO2 29   BUN 19 CREATININE 1.09   GLUCOSE 96   INR 1.0   PROTIME 13.2   APTT 32.0       No results for input(s): COVID19 in the last 720 hours. KARI Montero CNP  Electronically signed 5/16/2022 at [facility], APRN - CNP   Nurse Practitioner   General Surgery   H&P       Signed   Date of Service:  4/25/2022  8:00 AM         Related encounter: Pre-Admission Testing Visit 30 min from 4/25/2022 in [facility] PRE-ADMIT TESTING           Signed        Expand All Collapse All          Show:Clear all  [x]Manual[x]Template[]Copied    Added by:  [x]KARI Golden CNP      []Yamilka for details    History and Physical Service   [address] [facility]            Date of Evaluation:     4/25/2022  Patient name:              Anamaria Partida  MRN:                           [id number]  YOB: 1965  PCP:                            Heriberto Campbell MD     History Obtained From:      Patient, medical records     History of Present Illness: This is Anamaria Partida a 55 y.o. male who presents for a pre-admission testing appointment for an upcoming C 6-7 ACDF WITH HARDWARE REMOVAL @ C5-6 - [employer] [facility] by Omar Bullard MD scheduled on 5/16/2022  at 0730  due to [address]. The patient's chief complaint is neck shoulder and bilateral arm pain, numbness and tingling. 8-9 /10 pain that has progressively worsened over the past several years,. THE neck and shoulder pain  is CONTINUOUS WITH RIGHT ARM WEAKNESS INTERMITTENTLY, MINIMALLY CONTROLLED WITH ADVIL. Prior treatment includes STEROID INJECTIONS WHICH WERE HELPFUL AND PHYSICAL THERAPY WHICH WAS TEMPORARILY HELPFUL. Joe Alberto Denies recent falls and injuries. HAD A WHIPLASH INJURY 10 YEARS AGO. HE WORKS AS A . HE NOW PRESENTS FOR SURGICAL CORRECTION. Functional Capacity per pt:              1) Pt is able to walk 2 city blocks on level ground without SOB. 2) Pt is  able to climb 2 flights of stairs without SOB. 3) Pt is able to walk up a hill for 1-2 city blocks without SOB. Past Medical History:      Past Medical History        Past Medical History:   Diagnosis Date    Cancer ([address]       basil carcinoma    PONV (postoperative nausea and vomiting)              Past Surgical History:      Past Surgical History         Past Surgical History:   Procedure Laterality Date    ACHILLES TENDON SURGERY Right      APPENDECTOMY        COLONOSCOPY        KNEE CARTILAGE SURGERY Bilateral      NECK SURGERY        C5 -C6    ROTATOR CUFF REPAIR Right 2020    SKIN BIOPSY        ULNAR TUNNEL RELEASE Right 2020            Medications Prior to Admission:      Home Medications           Prior to Admission medications    Medication Sig Start Date End Date Taking? Authorizing Provider   rosuvastatin (CRESTOR) 10 MG tablet Take 10 mg by mouth at bedtime     Yes Historical Provider, MD   Multiple Vitamins-Minerals (THERAPEUTIC MULTIVITAMIN-MINERALS) tablet Take 1 tablet by mouth daily     Yes Historical Provider, MD   polycarbophil (FIBERCON) 625 MG tablet Take 625 mg by mouth daily     Yes Historical Provider, MD            Allergies:      Patient has no known allergies. Social History:      Tobacco:    reports that he has never smoked. He has never used smokeless tobacco.  Alcohol:      reports current alcohol use. Drug Use:  reports no history of drug use. Family History:      FATHER  OF HEART DISEASE, MOTHER  OF SUICIDE. 2 SIBLINGS ARE HEALTHY      Review of Systems:      Positive and Negative as described in HPI. CONSTITUTIONAL:  Negative for fevers, chills, sweats, fatigue, and weight loss. HEENT:  Negative for glasses, hearing changes, rhinorrhea, and throat pain. RESPIRATORY:  Negative for shortness of breath, cough, congestion, and wheezing.   CARDIOVASCULAR:  Negative for chest pain, blood clot, irregular heartbeat, and palpitations. GASTROINTESTINAL:  Negative for reflux, nausea, vomiting, diarrhea, constipation, change in bowel habits, and abdominal pain. GENITOURINARY:  Negative for difficulty of urination, burning with urination, and frequency. INTEGUMENT:  Negative for rash, skin lesions, and easy bruising. HEMATOLOGIC/LYMPHATIC:  Negative for swelling/edema. ALLERGIC/IMMUNOLOGIC:  Negative for urticaria and itching. ENDOCRINE: NEGATIVE for increase in thirst, increase in urination, and heat or cold intolerance. MUSCULOSKELETAL: NECK AND JOINT pains, muscle aches, and swelling of joints. NEUROLOGICAL:  Negative for headaches, dizziness, lightheadedness, numbness, and tingling extremities. BEHAVIOR/PSYCH:  Negative for depression and anxiety. Physical Exam:   BP (!) 143/76   Pulse 56   Temp 97.3 °F (36.3 °C) (Temporal)   Resp 20   Ht 6' (1.829 m)   Wt 187 lb (84.8 kg)   SpO2 99%   BMI 25.36 kg/m²   No results for input(s): POCGLU in the last 72 hours. General Appearance:  Alert, well appearing, and in no acute distress. Mental status:  Oriented to person, place, and time. Head:  Normocephalic and atraumatic. Eye:  No icterus, redness, pupils equal and reactive, extraocular eye movements intact, and conjunctiva clear. Ear:  Hearing grossly intact. Nose:  No drainage noted. Mouth:  Mucous membranes moist.  Neck:  Supple and no carotid bruits noted. Lungs:  Bilateral equal air entry, clear to auscultation, no wheezing, rales or rhonchi, and normal effort. Cardiovascular:  Normal rate, regular rhythm, no murmur, gallop, or rub. Abdomen:  Soft, nontender, nondistended, and active bowel sounds. Neurologic:  Normal speech and cranial nerves II through XII grossly intact. Strength 5/5 bilaterally. Skin:  No gross lesions, rashes, bruising, or bleeding on exposed skin area. Extremities:  Posterior tibial pulses 2+ bilaterally. No pedal edema. No calf tenderness with palpation.   Psych:  Normal affect. Investigations:       Laboratory Testing:  Recent Results         Recent Results (from the past 24 hour(s))   APTT     Collection Time: 04/25/22  8:23 AM   Result Value Ref Range     PTT 32.0 23.9 - 33.8 sec   CBC with Auto Differential     Collection Time: 04/25/22  8:23 AM   Result Value Ref Range     WBC 4.3 3.5 - 11.3 k/uL     RBC 5.00 4.21 - 5.77 m/uL     Hemoglobin 14.6 13.0 - 17.0 g/dL     Hematocrit 44.3 40.7 - 50.3 %     MCV 88.6 82.6 - 102.9 fL     MCH 29.2 25.2 - 33.5 pg     MCHC 33.0 28.4 - 34.8 g/dL     RDW 12.2 11.8 - 14.4 %     Platelets 437 865 - 231 k/uL     MPV 9.7 8.1 - 13.5 fL     NRBC Automated 0.0 0.0 per 100 WBC     Seg Neutrophils 56 36 - 65 %     Lymphocytes 28 24 - 43 %     Monocytes 11 3 - 12 %     Eosinophils % 3 1 - 4 %     Basophils 1 0 - 2 %     Immature Granulocytes 1 (H) 0 %     Segs Absolute 2.38 1.50 - 8.10 k/uL     Absolute Lymph # 1.19 1.10 - 3.70 k/uL     Absolute Mono # 0.48 0.10 - 1.20 k/uL     Absolute Eos # 0.14 0.00 - 0.44 k/uL     Basophils Absolute 0.04 0.00 - 0.20 k/uL     Absolute Immature Granulocyte 0.02 0.00 - 0.30 k/uL   Protime-INR     Collection Time: 04/25/22  8:23 AM   Result Value Ref Range     Protime 13.2 11.5 - 14.2 sec     INR 1.0              Recent Labs     04/25/22  0823   HGB 14.6   HCT 44.3   WBC 4.3   MCV 88.6   INR 1.0   PROTIME 13.2   APTT 32.0         No results for input(s): COVID19 in the last 720 hours. *Please note that labs listed above are the most recent lab values available in EPIC at the time of the visit and additional labs may have been drawn or resulted since that time. Imaging/Diagnostics:        No results found. EKG: EKG OF 4/25/2022 ON THE SHORT CHART See Psychiatric. Diagnosis:       1. DX CERVICAL STENOSIS     Plans:      1.  C 6-7 ACDF WITH HARDWARE REMOVAL @ C5-6 - [address] [facility]  4/25/2022  8:42 AM               Cosigned by: Yovany Ellison MD at 4/25/2022 10:54 AM Revision History

## 2022-05-16 NOTE — ANESTHESIA PRE PROCEDURE
Department of Anesthesiology  Preprocedure Note       Name:  Nataliia Fraga   Age:  55 y.o.  :  1965                                          MRN:  [id number]         Date:  2022      Surgeon: Gertrudis Oliveira):  Devang Herman MD    Procedure: Procedure(s):  C 6-7 ACDF WITH HARDWARE REMOVAL @ C5-6 - [facility]ABOR    Medications prior to admission:   Prior to Admission medications    Medication Sig Start Date End Date Taking? Authorizing Provider   rosuvastatin (CRESTOR) 10 MG tablet Take 10 mg by mouth at bedtime    Historical Provider, MD   Multiple Vitamins-Minerals (THERAPEUTIC MULTIVITAMIN-MINERALS) tablet Take 1 tablet by mouth daily    Historical Provider, MD   polycarbophil (FIBERCON) 625 MG tablet Take 625 mg by mouth daily    Historical Provider, MD       Current medications:    Current Facility-Administered Medications   Medication Dose Route Frequency Provider Last Rate Last Admin    lidocaine PF 1 % injection 1 mL  1 mL IntraDERmal Once PRN Nelson Roa MD        lactated ringers infusion   IntraVENous Continuous Nelson Roa  mL/hr at 22 0652 New Bag at 22 7490    sodium chloride flush 0.9 % injection 5-40 mL  5-40 mL IntraVENous 2 times per day Nelson Roa MD        sodium chloride flush 0.9 % injection 5-40 mL  5-40 mL IntraVENous PRN Ndal Nicky Montemayor MD        0.9 % sodium chloride infusion   IntraVENous PRN Nelson Roa MD        ceFAZolin (ANCEF) 2000 mg in dextrose 5 % 50 mL IVPB  2,000 mg IntraVENous Once Devang Herman MD        scopolamine (TRANSDERM-SCOP) transdermal patch 1 patch  1 patch TransDERmal Once Nelson Roa MD        diphenhydrAMINE (BENADRYL) injection 12.5 mg  12.5 mg IntraVENous Once Nelson Roa MD           Allergies:  No Known Allergies    Problem List:  There is no problem list on file for this patient.       Past Medical History:        Diagnosis Date    Arthritis     Cancer ([facility] [address]     basil carcinoma    Cervical disc disease     Neck pain     PONV (postoperative nausea and vomiting)        Past Surgical History:        Procedure Laterality Date    ACHILLES TENDON SURGERY Right     APPENDECTOMY      COLONOSCOPY      KNEE CARTILAGE SURGERY Bilateral     NECK SURGERY  2020    C5 -C6    ROTATOR CUFF REPAIR Right 12/2020    SKIN BIOPSY      ULNAR TUNNEL RELEASE Right 08/2020       Social History:    Social History     Tobacco Use    Smoking status: Never Smoker    Smokeless tobacco: Never Used   Substance Use Topics    Alcohol use: Yes     Comment: rarely                                Counseling given: Not Answered      Vital Signs (Current):   Vitals:    05/16/22 0623   BP: 120/77   Pulse: 58   Resp: 18   Temp: 97.7 °F (36.5 °C)   TempSrc: Temporal   SpO2: 100%   Weight: 187 lb (84.8 kg)   Height: 6' (1.829 m)                                              BP Readings from Last 3 Encounters:   05/16/22 120/77   04/25/22 (!) 143/76       NPO Status: Time of last liquid consumption: 2200                        Time of last solid consumption: 2200                        Date of last liquid consumption: 05/15/22                        Date of last solid food consumption: 05/15/22    BMI:   Wt Readings from Last 3 Encounters:   05/16/22 187 lb (84.8 kg)   04/25/22 187 lb (84.8 kg)     Body mass index is 25.36 kg/m². CBC:   Lab Results   Component Value Date    WBC 4.3 04/25/2022    RBC 5.00 04/25/2022    HGB 14.6 04/25/2022    HCT 44.3 04/25/2022    MCV 88.6 04/25/2022    RDW 12.2 04/25/2022     04/25/2022       CMP:   Lab Results   Component Value Date     04/25/2022    K 4.7 04/25/2022     04/25/2022    CO2 29 04/25/2022    BUN 19 04/25/2022    CREATININE 1.09 04/25/2022    GFRAA >60 04/25/2022    LABGLOM >60 04/25/2022    GLUCOSE 96 04/25/2022    CALCIUM 9.8 04/25/2022       POC Tests: No results for input(s): POCGLU, POCNA, POCK, POCCL, POCBUN, POCHEMO, POCHCT in the last 72 hours.     Coags:   Lab Results Component Value Date    PROTIME 13.2 04/25/2022    INR 1.0 04/25/2022    APTT 32.0 04/25/2022       HCG (If Applicable): No results found for: PREGTESTUR, PREGSERUM, HCG, HCGQUANT     ABGs: No results found for: PHART, PO2ART, IUZ8NXU, SHP7TLG, BEART, [id number]     Type & Screen (If Applicable):  No results found for: LABABO, LABRH    Drug/Infectious Status (If Applicable):  No results found for: HIV, HEPCAB    COVID-19 Screening (If Applicable): No results found for: COVID19        Anesthesia Evaluation     history of anesthetic complications: PONV. Airway: Mallampati: II  TM distance: >3 FB     Mouth opening: > = 3 FB Dental:          Pulmonary:normal exam                               Cardiovascular:  Exercise tolerance: good (>4 METS),   (+) hyperlipidemia    (-)  angina                Neuro/Psych:   (+) neuromuscular disease (cervical ddd):,             GI/Hepatic/Renal: Neg GI/Hepatic/Renal ROS       (-) GERD       Endo/Other:    (+) : arthritis:., malignancy/cancer (skin). Abdominal:             Vascular: Other Findings:           Anesthesia Plan      general     ASA 2     (Glidescope)  Induction: intravenous. MIPS: Postoperative opioids intended and Prophylactic antiemetics administered. Anesthetic plan and risks discussed with patient. Plan discussed with CRNA.     Attending anesthesiologist reviewed and agrees with Preprocedure content              Nereida Abbott MD   5/16/2022

## 2022-05-17 VITALS
HEART RATE: 58 BPM | HEIGHT: 72 IN | WEIGHT: 187 LBS | OXYGEN SATURATION: 97 % | RESPIRATION RATE: 17 BRPM | TEMPERATURE: 97.7 F | DIASTOLIC BLOOD PRESSURE: 66 MMHG | BODY MASS INDEX: 25.33 KG/M2 | SYSTOLIC BLOOD PRESSURE: 111 MMHG

## 2022-05-17 LAB
ABSOLUTE EOS #: 0 K/UL (ref 0–0.4)
ABSOLUTE IMMATURE GRANULOCYTE: 0.11 K/UL (ref 0–0.3)
ABSOLUTE LYMPH #: 0.66 K/UL (ref 1–4.8)
ABSOLUTE MONO #: 0.44 K/UL (ref 0.2–0.8)
ANION GAP SERPL CALCULATED.3IONS-SCNC: 12 MMOL/L (ref 9–17)
BASOPHILS # BLD: 0 %
BASOPHILS ABSOLUTE: 0 K/UL (ref 0–0.2)
BUN BLDV-MCNC: 17 MG/DL (ref 6–20)
BUN/CREAT BLD: 20 (ref 9–20)
CALCIUM SERPL-MCNC: 9.1 MG/DL (ref 8.6–10.4)
CHLORIDE BLD-SCNC: 107 MMOL/L (ref 98–107)
CO2: 24 MMOL/L (ref 20–31)
CREAT SERPL-MCNC: 0.87 MG/DL (ref 0.7–1.2)
EOSINOPHILS RELATIVE PERCENT: 0 % (ref 1–4)
GFR AFRICAN AMERICAN: >60 ML/MIN
GFR NON-AFRICAN AMERICAN: >60 ML/MIN
GFR SERPL CREATININE-BSD FRML MDRD: ABNORMAL ML/MIN/{1.73_M2}
GLUCOSE BLD-MCNC: 147 MG/DL (ref 70–99)
HCT VFR BLD CALC: 40.3 % (ref 40.7–50.3)
HEMOGLOBIN: 13.3 G/DL (ref 13–17)
IMMATURE GRANULOCYTES: 1 %
LYMPHOCYTES # BLD: 6 % (ref 24–44)
MCH RBC QN AUTO: 29.6 PG (ref 25.2–33.5)
MCHC RBC AUTO-ENTMCNC: 33 G/DL (ref 28.4–34.8)
MCV RBC AUTO: 89.8 FL (ref 82.6–102.9)
MONOCYTES # BLD: 4 % (ref 1–7)
NRBC AUTOMATED: 0 PER 100 WBC
PDW BLD-RTO: 12.4 % (ref 11.8–14.4)
PLATELET # BLD: 200 K/UL (ref 138–453)
PMV BLD AUTO: 10.8 FL (ref 8.1–13.5)
POTASSIUM SERPL-SCNC: 4.4 MMOL/L (ref 3.7–5.3)
RBC # BLD: 4.49 M/UL (ref 4.21–5.77)
SEG NEUTROPHILS: 89 % (ref 36–66)
SEGMENTED NEUTROPHILS ABSOLUTE COUNT: 9.79 K/UL (ref 1.8–7.7)
SODIUM BLD-SCNC: 143 MMOL/L (ref 135–144)
WBC # BLD: 11 K/UL (ref 3.5–11.3)

## 2022-05-17 PROCEDURE — 97116 GAIT TRAINING THERAPY: CPT

## 2022-05-17 PROCEDURE — 2580000003 HC RX 258: Performed by: ORTHOPAEDIC SURGERY

## 2022-05-17 PROCEDURE — 80048 BASIC METABOLIC PNL TOTAL CA: CPT

## 2022-05-17 PROCEDURE — 6370000000 HC RX 637 (ALT 250 FOR IP): Performed by: ORTHOPAEDIC SURGERY

## 2022-05-17 PROCEDURE — 97530 THERAPEUTIC ACTIVITIES: CPT

## 2022-05-17 PROCEDURE — 6360000002 HC RX W HCPCS: Performed by: ORTHOPAEDIC SURGERY

## 2022-05-17 PROCEDURE — 36415 COLL VENOUS BLD VENIPUNCTURE: CPT

## 2022-05-17 PROCEDURE — 85025 COMPLETE CBC W/AUTO DIFF WBC: CPT

## 2022-05-17 RX ORDER — OXYCODONE HYDROCHLORIDE AND ACETAMINOPHEN 5; 325 MG/1; MG/1
1-2 TABLET ORAL EVERY 4 HOURS PRN
Qty: 50 TABLET | Refills: 0 | Status: SHIPPED | OUTPATIENT
Start: 2022-05-17 | End: 2022-05-24

## 2022-05-17 RX ORDER — SENNA AND DOCUSATE SODIUM 50; 8.6 MG/1; MG/1
1 TABLET, FILM COATED ORAL 2 TIMES DAILY
Qty: 60 TABLET | Refills: 0 | Status: SHIPPED | OUTPATIENT
Start: 2022-05-17

## 2022-05-17 RX ORDER — TIZANIDINE 4 MG/1
4 TABLET ORAL EVERY 8 HOURS PRN
Qty: 50 TABLET | Refills: 0 | Status: SHIPPED | OUTPATIENT
Start: 2022-05-17

## 2022-05-17 RX ADMIN — FAMOTIDINE 20 MG: 20 TABLET, FILM COATED ORAL at 09:45

## 2022-05-17 RX ADMIN — POLYETHYLENE GLYCOL 3350 17 G: 17 POWDER, FOR SOLUTION ORAL at 09:45

## 2022-05-17 RX ADMIN — SODIUM CHLORIDE: 9 INJECTION, SOLUTION INTRAVENOUS at 06:15

## 2022-05-17 RX ADMIN — DEXAMETHASONE SODIUM PHOSPHATE 6 MG: 10 INJECTION INTRAMUSCULAR; INTRAVENOUS at 09:43

## 2022-05-17 RX ADMIN — CALCIUM POLYCARBOPHIL 625 MG: 625 TABLET, FILM COATED ORAL at 09:45

## 2022-05-17 RX ADMIN — MENTHOL 5.8 MG: 5.8 LOZENGE ORAL at 09:49

## 2022-05-17 RX ADMIN — SENNOSIDES AND DOCUSATE SODIUM 1 TABLET: 50; 8.6 TABLET ORAL at 09:45

## 2022-05-17 NOTE — PROGRESS NOTES
Physical Therapy  Facility/Department: [facility]  Rehabilitation Physical Therapy Treatment Note    NAME: Ermelinda Christianson III  : 1965 (55 y.o.)  MRN: [id number]  CODE STATUS: Full Code    Date of Service: 22       Restrictions:  Restrictions/Precautions: General Precautions;Surgical Protocols; Fall Risk  Required Braces or Orthoses  Cervical: c-collar  Position Activity Restriction  Other position/activity restrictions: ambulate, activity as tolerated, pt to wear Cervical collar, dysphagia precautions, L hand IV     SUBJECTIVE  Subjective  Subjective: pt pleasant and agreeable to PT has been ambulating in halls on his own        Post Treatment Pain Screening         OBJECTIVE  Cognition  Overall Cognitive Status: [facility]  Orientation  Orientation Level: Oriented X4    Functional Mobility  Bed Mobility  Overall Assistance Level: Modified Independent  Roll Left  Assistance Level: Modified independent      Environmental Mobility  Ambulation  Surface: Level surface  Distance: 150 ft x 2  Activity: Within Unit  Stairs  Stair Height: 6'';8''             PT Exercises  Exercise Treatment: reviewed HEP for general strengthening      ASSESSMENT/PROGRESS TOWARDS GOALS       AM-[facility] Inpatient Mobility Raw Score : 23  AM-[facility] Inpatient T-Scale Score : 56.93  Mobility Inpatient CMS 0-100% Score: 11.2  Mobility Inpatient CMS G-Code Modifier:CI            Assessment  Activity Tolerance: Patient tolerated treatment well  Discharge Recommendations: Home independently; Outpatient PT    Goals  Short Term Goals  Time Frame for Short term goals: 3 visits  Short term goal 1: Inc bed-mobility & transfers to independent to enable pt to safely get in/OOB & chair with spinal precautions  Short term goal 2:  Inc gait to amb 400 ft or > indep to enable pt to return to previous level of independence & promotion of home walking program  Short term goal 3: Ed spinal precautions, wearing of cervical collar at all times except for bathing & dressing, postural ex's, energy conservation & safety principles, prevention sedentary complications & home walking program, & issue written Pt Ed with Pt demonstrating GOOD carryover    PLAN OF CARE/SAFETY  Plan  Current Treatment Recommendations: Gait training;Home exercise program;Safety education & training;Patient/Caregiver education & training;Positioning  Safety Devices  Type of Devices: Call light within reach;Gait belt;Nurse notified      Therapy Time   Individual Concurrent Group Co-treatment   Time In 1048         Time Out 1111         Minutes 23                   [id number], PTA, 05/17/22 at 2:14 PM

## 2022-05-17 NOTE — PROGRESS NOTES
The patient was recently ambulating in the halls; steady gait. Awaiting his wife to arrive after lunch.

## 2022-05-17 NOTE — PLAN OF CARE
Problem: Pain  Goal: Verbalizes/displays adequate comfort level or baseline comfort level  5/17/2022 0105 by Bonnie Reynolds RN  Outcome: Progressing     Problem: ABCDS Injury Assessment  Goal: Absence of physical injury  5/17/2022 0105 by Bonnie Reynolds RN  Outcome: Progressing     Problem: Safety - Adult  Goal: Free from fall injury  5/17/2022 0105 by Bonnie Reynolds RN  Outcome: Progressing  Flowsheets (Taken 5/16/2022 2100)  Free From Fall Injury: Instruct family/caregiver on patient safety

## 2022-05-17 NOTE — PROGRESS NOTES
CLINICAL PHARMACY NOTE: MEDS TO BEDS    Total # of Prescriptions Filled: 2   The following medications were delivered to the patient:  · Tizanidine 4mg  · Senna plus 8.6-50mg    Additional Documentation:  Pt declined percocet rx. We gave original rx to pt to take with him.

## 2022-05-17 NOTE — CARE COORDINATION
Case Management Initial Discharge Plan  Roselyn ALVAREZ Candice III,         Readmission Risk              Risk of Unplanned Readmission:  0             Met with:patient to discuss discharge plans. Information verified: address, contacts, phone number, , insurance Yes  PCP: Stella Leon MD  Date of last visit: 22    Insurance Provider: Mustapha Rios     Discharge Planning  Current Residence:  2 story with wife   Living Arrangements:    wife   Home has 2 stories/2 stairs to climb FFBB   Support Systems:   Spouse   Current Services PTA:  Na  Agency: na   Patient able to perform ADL's:Independent  DME in home:  na  DME used to aid ambulation prior to admission:   Na   DME used during admission:  c collar     Potential Assistance Needed:       Pharmacy: [facility]    Potential Assistance Purchasing Medications:     Does patient want to participate in local refill/ meds to beds program?  Yes    Patient agreeable to home care: No  [place] of choice provided:  n/a      Type of Home Care Services:     Patient expects to be discharged to:       Prior SNF/Rehab Placement and Facility: no   Agreeable to SNF/Rehab: No  [place] of choice provided: n/a   Evaluation: n/a    Expected Discharge date: Follow Up Appointment: Best Day/ Time:      Transportation provider: wife   Transportation arrangements needed for discharge: No    Discharge Plan: Pt is independent. Has c collar in place. No DME. No needs. Has f/u .          Electronically signed by Aletha Murcia RN on 22 at 10:38 AM EDT

## 2022-05-17 NOTE — PLAN OF CARE
Problem: Discharge Planning  Goal: Discharge to home or other facility with appropriate resources  Outcome: Adequate for Discharge     Problem: Pain  Goal: Verbalizes/displays adequate comfort level or baseline comfort level  Outcome: Adequate for Discharge       Problem: ABCDS Injury Assessment  Goal: Absence of physical injury  Outcome: Adequate for Discharge     Problem: Safety - Adult  Goal: Free from fall injury  Outcome: Adequate for Discharge

## 2022-05-17 NOTE — PROGRESS NOTES
No complaints of pain. Tolerating low fat diet. Up to chair today. Patients family at bedside. Safety precautions in place and call light within reach.    Problem: Patient Centered Care  Goal: Patient preferences are identified and integrated in the patien Pt doing well on the night shift. Pt ambulating hallways per self; drinking fluids; voiding well. States minimal pain; denied need for pain medication last night. Pt is using his incentive spirometer. Slept well throughout the night. increased pain with activity and pre-medicate as appropriate  Outcome: Progressing     Problem: SAFETY ADULT - FALL  Goal: Free from fall injury  Description: INTERVENTIONS:  - Assess pt frequently for physical needs  - Identify cognitive and physical defi wounds/incisions during mobilization  - Obtain PT/OT consults as needed  - Advance activity as appropriate  - Communicate ordered activity level and limitations with patient/family  Outcome: Progressing

## 2022-05-17 NOTE — PROGRESS NOTES
[facility]  Attending Progress Note  5/17/2022  7:53 AM     Blair Galloway III    1965   [id number]      SUBJECTIVE:  Doing well. Pain controlled. Has been up walking well. Denies arm symptoms. No CP/SOB    OBJECTIVE      Physical      VITALS:  /61   Pulse 57   Temp 98.4 °F (36.9 °C) (Oral)   Resp 17   Ht 6' (1.829 m)   Wt 187 lb (84.8 kg)   SpO2 95%   BMI 25.36 kg/m²     Dressing C/D/I    NEUROLOGIC: Alert and Oriented x 3. Strength 5/5 HF, 5/5 Q, 5/5 TA, 5/5 EHL, 5/5 GS. 5/5 D, 5/5 B, 5/5 T, 5/5 WE, 5/5 WF, 5/5 I                                                                  Sensation intact.      Data  CBC:   Lab Results   Component Value Date    WBC 11.0 05/17/2022    RBC 4.49 05/17/2022    HGB 13.3 05/17/2022    HCT 40.3 05/17/2022    MCV 89.8 05/17/2022    MCH 29.6 05/17/2022    MCHC 33.0 05/17/2022    RDW 12.4 05/17/2022     05/17/2022    MPV 10.8 05/17/2022     BMP:    Lab Results   Component Value Date     05/17/2022    K 4.4 05/17/2022     05/17/2022    CO2 24 05/17/2022    BUN 17 05/17/2022    CREATININE 0.87 05/17/2022    CALCIUM 9.1 05/17/2022    GFRAA >60 05/17/2022    LABGLOM >60 05/17/2022    GLUCOSE 147 05/17/2022           Current Inpatient Medications    Current Facility-Administered Medications: polycarbophil (FIBERCON) tablet 625 mg, 625 mg, Oral, Daily  rosuvastatin (CRESTOR) tablet 10 mg, 10 mg, Oral, Nightly  0.9 % sodium chloride infusion, , IntraVENous, Continuous  sodium chloride flush 0.9 % injection 5-40 mL, 5-40 mL, IntraVENous, 2 times per day  sodium chloride flush 0.9 % injection 5-40 mL, 5-40 mL, IntraVENous, PRN  0.9 % sodium chloride infusion, , IntraVENous, PRN  oxyCODONE (ROXICODONE) immediate release tablet 5 mg, 5 mg, Oral, Q4H PRN **OR** oxyCODONE (ROXICODONE) immediate release tablet 10 mg, 10 mg, Oral, Q4H PRN  morphine (PF) injection 2 mg, 2 mg, IntraVENous, Q2H PRN **OR** morphine sulfate (PF) injection 4 mg, 4 mg, IntraVENous, Q2H PRN  famotidine (PEPCID) tablet 20 mg, 20 mg, Oral, BID **OR** famotidine (PEPCID) 20 mg in sodium chloride (PF) 10 mL injection, 20 mg, IntraVENous, BID  hydrOXYzine (ATARAX) tablet 10 mg, 10 mg, Oral, Q8H PRN  promethazine (PHENERGAN) tablet 12.5 mg, 12.5 mg, Oral, Q6H PRN **OR** ondansetron (ZOFRAN) injection 4 mg, 4 mg, IntraVENous, Q6H PRN  polyethylene glycol (GLYCOLAX) packet 17 g, 17 g, Oral, Daily  sennosides-docusate sodium (SENOKOT-S) 8.6-50 MG tablet 1 tablet, 1 tablet, Oral, BID  phenol 1.4 % mouth spray 1 spray, 1 spray, Mouth/Throat, Q2H PRN  oxyCODONE-acetaminophen (PERCOCET) 5-325 MG per tablet 1 tablet, 1 tablet, Oral, Q4H PRN **OR** oxyCODONE-acetaminophen (PERCOCET) 5-325 MG per tablet 2 tablet, 2 tablet, Oral, Q4H PRN  dexamethasone (PF) (DECADRON) injection 6 mg, 6 mg, IntraVENous, Q8H  tiZANidine (ZANAFLEX) tablet 4 mg, 4 mg, Oral, Q8H PRN  menthol lozenge 5.8 mg, 1 lozenge, Oral, Q2H PRN    ASSESSMENT AND PLAN    55 y.o. male status post C6-7 ACDF post op day #  1    1. PT- WBAT  2. Pain control  3. EPC  4.  D/C plan for home today    Mary Estrada MD  [facility]  Spine Surgeon  [phone]

## 2022-05-17 NOTE — OP NOTE
Operative Note      Patient: Heri Chavez  YOB: 1965  MRN: [id number]    Date of Procedure: 5/16/2022    SURGEON:  Rebecca Linares MD     ANESTHESIA:  General endotracheal anesthesia. PREOPERATIVE DIAGNOSIS:  C6-7 Cervical spondylosis with radiculopathy with adjacent segment degeneration     POSTOPERATIVE DIAGNOSIS:  C6-7 Cervical spondylosis with radiculopathy with adjacent segment degeneration     PROCEDURE:  1. C6-7 anterior cervical diskectomy and fusion   2. Insertion of interbody cage for spinal fusion at C6-7.  3. Insertion of anterior cervical plate and screws at J0-7      utilizing Depuy Synthes Zero-P plate and screws. 4. Gilbert of local bone for spinal fusion. 5. Use of allograft bone for spinal fusion to include an      Vivigen  6. Intraoperative use of C-arm fluoroscopy. ESTIMATED BLOOD LOSS:  30 mL. FLUIDS:  Per anesthesia record. COMPLICATIONS:  None. INDICATIONS:  This is a pleasant 55year-old female with a  longstanding history of significant neck pain radiating to her  Right upper extremity. He had a history of a previous C4-5  ACDF years ago. He had done extensive conservative  management to include physical therapy, medications, and pain  management all with minimal long-standing benefit. Due to her  continued symptoms and failure of conservative management, it was  discussed with him the option of performing a C6-7 anterior  cervical diskectomy and fusion. After MRI did show significant  Right sided stenosis and disc consistent with his symptoms at C6-7     Risks and benefits were discussed including bleeding, infection,  injury to nerves, vessels, anesthetic risk, need for possible  further future surgery as well as the possibility for continued  pain, continued symptoms, possible nonunion. He did understand  all these risks, did wish to proceed. Informed consent was  obtained.      PROCEDURE:  Patient was taken to the operating room and placed  supine on the operating table. He was intubated and placed  under general anesthesia by anesthesiologist.  He was given  preoperative antibiotic prophylaxis. A shoulder bump was placed,  arms were tucked at hissides. All bony prominences were padded. The neck was slightly extended and the neck was then prepped and  draped in a sterile fashion. Marking pen was used to shalom out  the left C6-7 region and approximately a 3-4 cm incision was  made over the left side of the neck over the C6-7 level. The  dissection was then carried down through the subcutaneous  tissues. Platysma was incised in line with the skin incision. Metzenbaum scissors were then used to carefully dissect both  proximally and distally and undermining the platysma. The deep  cervical fascia was then carefully opened medial to the  sternocleidomastoid. Blunt finger dissection was then used to  proceed medial to the sternocleidomastoid and carotid sheath and  lateral to the esophagus and trachea, down to the anterior  cervical spine. Due to his previous surgery, there was significant scar tissue present which had to be dissected and did add time and complexity to the case. Handheld Cloward was then used to expose the  prevertebral fascia and Kitners were used to bluntly dissect this  free to expose the anterior longitudinal ligament and expose the previous plate. At this  point, a bent needle was then placed at the presumed C6-7 level. C-arm was then brought in, in lateral view and confirmed it to be  at the appropriate level. At this point, the disk space was  marked with the Bovie. Bovie was then used to dissect the longus  coli free bilaterally and the Trimline retractor was inserted. Anterior osteophytes were then removed with a Leksell and this  bone was harvested for later use. The long handle knife was then  used to incise the disc. Partial diskectomy was then performed.   Distractor pins were then placed at the C6-7 level and  distraction was then performed. A curette was used to remove the  cartilaginous endplates as well as the remainder of the disk  material back to the posterior longitudinal ligament at the C6-7  level. Once this was done, enrique was used to bur the posterior  osteophytes. The curette was used to again remove the posterior  osteophytes as well as the free up the foramen. Kerrison was used   to perform foraminotomies and full      decompression was complete. Nerve hook was used to verify      that the foramen were completely free which they were. At      this point, the endplates were prepped. Sizing was then      performed showed that a 8mm depuy zero-p plate and cage would      be appropriate. This was packed with the local bone, which      was previously harvested as well as the Vivigen      allograft bone. The cage was then packed into the disk      space until it was fully seated. .  The distractor pins were then removed and the head was then flexed up slightly to allow compression at the interspace at      C6-7. Awl was used for starting holes and screws were then placed. 16mm screws were placed through the plate and cage until the screws were fully seated and locking mechanism was then engaged. Wound was then irrigated with sterile normal saline. The Trimline retractor was then removed. C-arm was      brought back in and confirmed the instrumentation was in      excellent position at the C6-7 level. At this point, the      wound was then reinspected utilizing hand-held Cloward      confirmed that there was no significant bleeding present. The wound was once again irrigated. The closure was then      performed with a 3-0 Vicryl followed by a running 4-0      Monocryl suture. Dermabond glue was applied and standard      dressing, standard collar was then applied.   He was then      awoken from anesthesia, extubated, and taken to recovery      room in stable condition.     Electronically signed by Isidra Araya MD on 5/16/2022 at 9:35 PM

## (undated) DEVICE — RESERVOIR,SUCTION,100CC,SILICONE: Brand: MEDLINE

## (undated) DEVICE — YANKAUER,FLEXIBLE HANDLE,REGLR CAPACITY: Brand: MEDLINE INDUSTRIES, INC.

## (undated) DEVICE — GAUZE, BORDER, 3"X6", 1.5"X4"PAD, STERIL: Brand: MEDLINE INDUSTRIES, INC.

## (undated) DEVICE — DRAIN SURG FLAT W7MMXL20CM FULL PERF

## (undated) DEVICE — APPLICATOR MEDICATED 10.5 CC SOLUTION HI LT ORNG CHLORAPREP

## (undated) DEVICE — SUTURE MCRYL SZ 4-0 L27IN ABSRB UD L19MM PS-2 1/2 CIR PRIM Y426H

## (undated) DEVICE — SUTURE VCRL SZ 3-0 L27IN ABSRB UD L26MM SH 1/2 CIR J416H

## (undated) DEVICE — PROTECTOR EYE PT SELF ADH NS OPT GRD LF

## (undated) DEVICE — ABS MED DISTRACTION PIN, 14MM PATIENT (INNER): Brand: ABS MED DISTRACTION PIN

## (undated) DEVICE — C-ARM: Brand: UNBRANDED

## (undated) DEVICE — SPONGE,PEANUT,XRAY,ST,SM,3/8",5/CARD: Brand: MEDLINE INDUSTRIES, INC.

## (undated) DEVICE — ADHESIVE SKIN CLOSURE TOP 36 CC HI VISC DERMBND MINI

## (undated) DEVICE — 1010 S-DRAPE TOWEL DRAPE 10/BX: Brand: STERI-DRAPE™

## (undated) DEVICE — GLOVE SURG SZ 85 CRM LTX FREE POLYISOPRENE POLYMER BEAD ANTI

## (undated) DEVICE — Device

## (undated) DEVICE — CODMAN® SURGICAL PATTIES 1" X 1" (2.54CM X 2.54CM): Brand: CODMAN®

## (undated) DEVICE — 4.0MM PRECISION ROUND

## (undated) DEVICE — CORD,CAUTERY,BIPOLAR,STERILE: Brand: MEDLINE

## (undated) DEVICE — TUBING, SUCTION, 1/4" X 12', STRAIGHT: Brand: MEDLINE

## (undated) DEVICE — ULTRACLEAN ACCESSORY ELECTRODE, 1 INCH COATED NEEDLE WITH EXTENDED INSULATION: Brand: ULTRACLEAN